# Patient Record
Sex: FEMALE | Race: WHITE | Employment: FULL TIME | URBAN - METROPOLITAN AREA
[De-identification: names, ages, dates, MRNs, and addresses within clinical notes are randomized per-mention and may not be internally consistent; named-entity substitution may affect disease eponyms.]

---

## 2017-08-01 ENCOUNTER — OFFICE VISIT (OUTPATIENT)
Dept: FAMILY MEDICINE CLINIC | Age: 42
End: 2017-08-01

## 2017-08-01 VITALS
SYSTOLIC BLOOD PRESSURE: 118 MMHG | HEIGHT: 68 IN | OXYGEN SATURATION: 98 % | RESPIRATION RATE: 16 BRPM | WEIGHT: 231.4 LBS | DIASTOLIC BLOOD PRESSURE: 59 MMHG | HEART RATE: 67 BPM | BODY MASS INDEX: 35.07 KG/M2 | TEMPERATURE: 98.9 F

## 2017-08-01 DIAGNOSIS — Z13.1 DIABETES MELLITUS SCREENING: ICD-10-CM

## 2017-08-01 DIAGNOSIS — Z13.220 SCREENING CHOLESTEROL LEVEL: ICD-10-CM

## 2017-08-01 DIAGNOSIS — Z13.220 SCREENING CHOLESTEROL LEVEL: Primary | ICD-10-CM

## 2017-08-01 RX ORDER — PHENTERMINE HYDROCHLORIDE 37.5 MG/1
37.5 TABLET ORAL
Qty: 30 TAB | Refills: 0 | Status: SHIPPED | OUTPATIENT
Start: 2017-08-01 | End: 2017-08-01 | Stop reason: CLARIF

## 2017-08-01 NOTE — PROGRESS NOTES
Albert Gordon is a 40 y.o.  female and presents with     Chief Complaint   Patient presents with    Physical     Subjective: Additional Concerns: none    Patient Active Problem List    Diagnosis Date Noted    Anemia 2016    Menorrhagia with regular cycle 2016    Anxiety 2016     Current Outpatient Prescriptions   Medication Sig Dispense Refill    Iron, Cbn & Gluc-FA-B12-C-DSS (FERRALET 90 DUAL-IRON DELIVERY) 90-1-12-50 mg-mg-mcg-mg tab Take 90 mg by mouth daily. 30 Tab 3    aspirin-acetaminophen-caffeine (EXCEDRIN ES) 250-250-65 mg per tablet Take 1 Tab by mouth every eight (8) hours as needed for Headache (for headaches during menses).  hydrOXYzine (VISTARIL) 25 mg capsule Take 1 Cap by mouth three (3) times daily as needed for Anxiety. Indications: ANXIETY 10 Cap 0     Allergies   Allergen Reactions    Pcn [Penicillins] Rash and Nausea and Vomiting     No past medical history on file.   Past Surgical History:   Procedure Laterality Date    HX GYN      2 c section - 2009     Family History   Problem Relation Age of Onset    Colon Cancer Maternal Grandmother     Cancer Maternal Grandfather       of bone cancer    Emphysema Paternal Grandmother       of emphysema - heavy smoker    Other Paternal Grandfather       of brain aneurysm     Social History   Substance Use Topics    Smoking status: Never Smoker    Smokeless tobacco: Never Used    Alcohol use No     ROS     General: negative for - chills, fatigue, fever, weight change  Endo: negative for - hot flashes, polydipsia/polyuria or temperature intolerance  Resp: negative for - cough, shortness of breath or wheezing  CV: negative for - chest pain, edema or palpitations  Neuro: negative for - confusion, headaches, seizures or weakness    Objective:  Vitals:    17 1249   BP: 118/59   Pulse: 67   Resp: 16   Temp: 98.9 °F (37.2 °C)   TempSrc: Oral   SpO2: 98%   Weight: 231 lb 6.4 oz (105 kg) Height: 5' 8\" (1.727 m)   PainSc:   0 - No pain   LMP: 07/15/2017     PE    Alert, well appearing, and in no distress, oriented to person, place, and time and overweight  Mental status - alert, oriented to person, place, and time, normal mood, behavior, speech, dress, motor activity, and thought processes  Chest - clear to auscultation, no wheezes, rales or rhonchi, symmetric air entry  Heart - normal rate, regular rhythm, normal S1, S2, no murmurs, rubs, clicks or gallops  Extremities - peripheral pulses normal, no pedal edema, no clubbing or cyanosis    LABS   No visits with results within 6 Month(s) from this visit. Latest known visit with results is:    Orders Only on 07/14/2016   Component Date Value Ref Range Status    T4, Free 07/18/2016 1.2  0.9 - 1.8 ng/dL Final    WBC 07/18/2016 4.3  4.0 - 11.0 K/uL Final    RBC 07/18/2016 4.40  3.80 - 5.20 M/uL Final    HGB 07/18/2016 11.0* 11.7 - 15.5 g/dL Final    HCT 07/18/2016 35.4  35.1 - 46.5 % Final    MCV 07/18/2016 81  80 - 95 fL Final    MCH 07/18/2016 25* 26 - 34 pg Final    MCHC 07/18/2016 31* 32 - 36 g/dL Final    RDW 07/18/2016 16.4* 10.0 - 16.0 % Final    PLATELET 15/57/3888 101  140 - 440 K/uL Final    MPV 07/18/2016 11.5* 6.0 - 10.8 fL Final    Glucose 07/18/2016 92  65 - 99 mg/dL Final    BUN 07/18/2016 18  6 - 22 mg/dL Final    Creatinine 07/18/2016 0.9  0.5 - 1.2 mg/dL Final    Sodium 07/18/2016 140  133 - 145 mmol/L Final    Potassium 07/18/2016 4.4  3.5 - 5.5 mmol/L Final    Chloride 07/18/2016 102  98 - 110 mmol/L Final    CO2 07/18/2016 25  20 - 32 mmol/L Final    AST (SGOT) 07/18/2016 15  10 - 37 U/L Final    ALT (SGPT) 07/18/2016 12  5 - 40 U/L Final    Alk.  phosphatase 07/18/2016 44  25 - 115 U/L Final    Bilirubin, total 07/18/2016 0.3  0.2 - 1.2 mg/dL Final    Calcium 07/18/2016 9.1  8.4 - 10.5 mg/dL Final    Protein, total 07/18/2016 6.9  6.4 - 8.3 g/dL Final    Albumin 07/18/2016 4.5  3.5 - 5.0 g/dL Final    A-G Ratio 07/18/2016 1.9  1.1 - 2.6 ratio Final    Globulin 07/18/2016 2.4  2.0 - 4.0 g/dL Final    Anion gap 07/18/2016 13.0  mmol/L Final    Comment: Test includes Albumin, Alkaline Phosphatase, ALT, AST, BUN, Calcium, CO2,  Chloride, Creatinine, Glucose, Potassium, Sodium, Total Bilirubin and Total  Protein. Estimated GFR results are reported in mL/min/1.73 sq.m. by the MDRD equation. This eGFR is validated for stable chronic renal failure patients. This   equation  is unreliable in acute illness or patients with normal renal function.  GFRAA 07/18/2016 >60.0  >60.0 Final    GFRNA 07/18/2016 >60.0  >60.0 Final    Triglyceride 07/18/2016 103  40 - 149 mg/dL Final    HDL Cholesterol 07/18/2016 42  40 - 59 mg/dL Final    Cholesterol, total 07/18/2016 145  110 - 200 mg/dL Final    LDL, calculated 07/18/2016 82  50 - 99 mg/dL Final    VLDL, calculated 07/18/2016 21  8 - 30 mg/dL Final    Comment: Test includes cholesterol, HDL cholesterol, triglycerides and LDL.   Cholesterol Recommended NCEP guidelines in mg/dL:  Less than 200      Desirable  200 - 239          Borderline High  Greater than or  = to 240   High      VITAMIN D, 25-HYDROXY 07/18/2016 30.3* 32.0 - 100.0 ng/mL Final       TESTS  Results for orders placed or performed in visit on 07/14/16   T4, FREE   Result Value Ref Range    T4, Free 1.2 0.9 - 1.8 ng/dL   CBC W/O DIFF   Result Value Ref Range    WBC 4.3 4.0 - 11.0 K/uL    RBC 4.40 3.80 - 5.20 M/uL    HGB 11.0 (L) 11.7 - 15.5 g/dL    HCT 35.4 35.1 - 46.5 %    MCV 81 80 - 95 fL    MCH 25 (L) 26 - 34 pg    MCHC 31 (L) 32 - 36 g/dL    RDW 16.4 (H) 10.0 - 16.0 %    PLATELET 650 681 - 809 K/uL    MPV 11.5 (H) 6.0 - 03.9 fL   METABOLIC PANEL, COMPREHENSIVE   Result Value Ref Range    Glucose 92 65 - 99 mg/dL    BUN 18 6 - 22 mg/dL    Creatinine 0.9 0.5 - 1.2 mg/dL    Sodium 140 133 - 145 mmol/L    Potassium 4.4 3.5 - 5.5 mmol/L    Chloride 102 98 - 110 mmol/L    CO2 25 20 - 32 mmol/L    AST (SGOT) 15 10 - 37 U/L    ALT (SGPT) 12 5 - 40 U/L    Alk. phosphatase 44 25 - 115 U/L    Bilirubin, total 0.3 0.2 - 1.2 mg/dL    Calcium 9.1 8.4 - 10.5 mg/dL    Protein, total 6.9 6.4 - 8.3 g/dL    Albumin 4.5 3.5 - 5.0 g/dL    A-G Ratio 1.9 1.1 - 2.6 ratio    Globulin 2.4 2.0 - 4.0 g/dL    Anion gap 13.0 mmol/L    GFRAA >60.0 >60.0    GFRNA >60.0 >60.0   LIPID PANEL   Result Value Ref Range    Triglyceride 103 40 - 149 mg/dL    HDL Cholesterol 42 40 - 59 mg/dL    Cholesterol, total 145 110 - 200 mg/dL    LDL, calculated 82 50 - 99 mg/dL    VLDL, calculated 21 8 - 30 mg/dL   VITAMIN D, 25 HYDROXY   Result Value Ref Range    VITAMIN D, 25-HYDROXY 30.3 (L) 32.0 - 100.0 ng/mL     Assessment/Plan:      1. Screening cholesterol level  - LIPID PANEL; Future    2. Diabetes mellitus screening  - HEMOGLOBIN A1C WITH EAG; Future    3. Weight gain - Patient given info about nutrition weight loss program and some free online resources. Lab review: orders written for new lab studies as appropriate; see orders. I have discussed the diagnosis with the patient and the intended plan as seen in the above orders. The patient has received an after-visit summary and questions were answered concerning future plans. I have discussed medication side effects and warnings with the patient as well. I have reviewed the plan of care with the patient, accepted their input and they are in agreement with the treatment goals. F/U as needed.      Jo Ann Coker MD

## 2017-08-01 NOTE — PROGRESS NOTES
Nicholas Forte is a 40 y.o. female presents to office for CPE. 1. Have you been to the ER, urgent care clinic or hospitalized since your last visit? yes  2. Have you seen any other providers outside of [facility] since your last visit? yes  3.  Have you had a Flu shot this year? no      Health Maintenance items with a due date reviewed with patient:  Health Maintenance Due   Topic Date Due    DTaP/Tdap/Td series (1 - Tdap) 03/13/1997    PAP AKA CERVICAL CYTOLOGY  12/15/2014    INFLUENZA AGE 9 TO ADULT  08/01/2017

## 2017-08-01 NOTE — PATIENT INSTRUCTIONS
Learning About High Cholesterol  What is high cholesterol? Cholesterol is a type of fat in your blood. It is needed for many body functions, such as making new cells. Cholesterol is made by your body. It also comes from food you eat. If you have too much cholesterol, it starts to build up in your arteries. This is called hardening of the arteries, or atherosclerosis. High cholesterol raises your risk of a heart attack and stroke. There are different types of cholesterol. LDL is the \"bad\" cholesterol. High LDL can raise your risk for heart disease, heart attack, and stroke. HDL is the \"good\" cholesterol. High HDL is linked with a lower risk for heart disease, heart attack, and stroke. Your cholesterol levels help your doctor find out your risk for having a heart attack or stroke. How can you prevent high cholesterol? A heart-healthy lifestyle can help you prevent high cholesterol. This lifestyle helps lower your risk for a heart attack and stroke. · Eat heart-healthy foods. ¨ Eat fruits, vegetables, whole grains (like oatmeal), dried beans and peas, nuts and seeds, soy products (like tofu), and fat-free or low-fat dairy products. ¨ Replace butter, margarine, and hydrogenated or partially hydrogenated oils with olive and canola oils. (Canola oil margarine without trans fat is fine.)  ¨ Replace red meat with fish, poultry, and soy protein (like tofu). ¨ Limit processed and packaged foods like chips, crackers, and cookies. · Be active. Exercise can improve your cholesterol level. Get at least 30 minutes of exercise on most days of the week. Walking is a good choice. You also may want to do other activities, such as running, swimming, cycling, or playing tennis or team sports. · Stay at a healthy weight. Lose weight if you need to. · Don't smoke. If you need help quitting, talk to your doctor about stop-smoking programs and medicines. These can increase your chances of quitting for good.   How is high cholesterol treated? The goal of treatment is to reduce your chances of having a heart attack or stroke. The goal is not to lower your cholesterol numbers only. · You may make lifestyle changes, such as eating healthy foods, not smoking, losing weight, and being more active. · You may have to take medicine. Follow-up care is a key part of your treatment and safety. Be sure to make and go to all appointments, and call your doctor if you are having problems. It's also a good idea to know your test results and keep a list of the medicines you take. Where can you learn more? Go to http://bebeto-zulay.info/. Enter P184 in the search box to learn more about \"Learning About High Cholesterol. \"  Current as of: April 3, 2017  Content Version: 11.3  © 4940-2431 [employer]. Care instructions adapted under license by [employer] (which disclaims liability or warranty for this information). If you have questions about a medical condition or this instruction, always ask your healthcare professional. [address] any warranty or liability for your use of this information. Hemoglobin A1c: About This Test  What is it? Hemoglobin A1c is a blood test that checks your average blood sugar level over the past 2 to 3 months. This test also is called a glycohemoglobin test or an A1c test.  Why is this test done? The A1c test is done to check how well your diabetes has been controlled over the past 2 to 3 months. Your doctor can use this information to adjust your medicine and diabetes treatment, if needed. How can you prepare for the test?  You do not need to stop eating before you have an A1c test. This test can be done at any time during the day, even after a meal.  What happens during the test?  The health professional taking a sample of your blood will:  · Wrap an elastic band around your upper arm.  This makes the veins below the band larger so it is easier to put a needle into the vein. · Clean the needle site with alcohol. · Put the needle into the vein. · Attach a tube to the needle to fill it with blood. · Remove the band from your arm when enough blood is collected. · Put a gauze pad or cotton ball over the needle site as the needle is removed. · Put pressure on the site and then put on a bandage. What else should you know about the test?  The test result is usually given as a percentage. The normal A1c is less than 5.7%. The A1c test result also can be used to find your estimated average glucose, or eAG. Your eAG and A1c show the same thing in two different ways. They both help you learn more about your average blood sugar range over the past 2 to 3 months. Where can you learn more? Go to http://bebeto-zulay.info/. Enter U216 in the search box to learn more about \"Hemoglobin A1c: About This Test.\"  Current as of: March 13, 2017  Content Version: 11.3  © 9529-0518 [employer]. Care instructions adapted under license by [employer] (which disclaims liability or warranty for this information). If you have questions about a medical condition or this instruction, always ask your healthcare professional. [place] [address] any warranty or liability for your use of this information.

## 2017-11-13 ENCOUNTER — TELEPHONE (OUTPATIENT)
Dept: FAMILY MEDICINE CLINIC | Age: 42
End: 2017-11-13

## 2017-11-13 NOTE — TELEPHONE ENCOUNTER
Called patient to remind her that she has not yet gotten her ordered labs completed. We are unable to fill out her Health Screening form for work.  Left message

## 2018-08-07 ENCOUNTER — OFFICE VISIT (OUTPATIENT)
Dept: FAMILY MEDICINE CLINIC | Age: 43
End: 2018-08-07

## 2018-08-07 VITALS
WEIGHT: 233 LBS | OXYGEN SATURATION: 98 % | TEMPERATURE: 97 F | DIASTOLIC BLOOD PRESSURE: 79 MMHG | BODY MASS INDEX: 35.31 KG/M2 | SYSTOLIC BLOOD PRESSURE: 123 MMHG | HEART RATE: 74 BPM | HEIGHT: 68 IN | RESPIRATION RATE: 17 BRPM

## 2018-08-07 DIAGNOSIS — D22.9 ATYPICAL MOLE: ICD-10-CM

## 2018-08-07 DIAGNOSIS — Z00.00 PHYSICAL EXAM: Primary | ICD-10-CM

## 2018-08-07 DIAGNOSIS — R06.83 SNORING: ICD-10-CM

## 2018-08-07 DIAGNOSIS — D64.9 ANEMIA, UNSPECIFIED TYPE: ICD-10-CM

## 2018-08-07 DIAGNOSIS — Z80.0 FAMILY HISTORY OF COLON CANCER REQUIRING SCREENING COLONOSCOPY: ICD-10-CM

## 2018-08-07 PROBLEM — E66.01 SEVERE OBESITY (BMI 35.0-39.9): Status: ACTIVE | Noted: 2018-08-07

## 2018-08-07 NOTE — PROGRESS NOTES
Noble Casanova is a 44 y.o.  female and presents with screening physical, fatigue, skin moles, snoring with unrefreshed sleep  And screening colonoscopy due to early family hx fo colon cancer. Chief Complaint   Patient presents with    Complete Physical     Subjective: Additional Concerns: none    Patient Active Problem List    Diagnosis Date Noted    Severe obesity (BMI 35.0-39.9) ([facility] [address] 2018    Anemia 2016    Menorrhagia with regular cycle 2016    Anxiety 2016     Current Outpatient Prescriptions   Medication Sig Dispense Refill    Iron, Cbn & Gluc-FA-B12-C-DSS (FERRALET 90 DUAL-IRON DELIVERY) 90-1-12-50 mg-mg-mcg-mg tab Take 90 mg by mouth daily. 30 Tab 3    aspirin-acetaminophen-caffeine (EXCEDRIN ES) 250-250-65 mg per tablet Take 1 Tab by mouth every eight (8) hours as needed for Headache (for headaches during menses).  hydrOXYzine (VISTARIL) 25 mg capsule Take 1 Cap by mouth three (3) times daily as needed for Anxiety. Indications: ANXIETY 10 Cap 0     Allergies   Allergen Reactions    Pcn [Penicillins] Rash and Nausea and Vomiting     No past medical history on file.   Past Surgical History:   Procedure Laterality Date    HX GYN      2 c section - 2009     Family History   Problem Relation Age of Onset    Colon Cancer Maternal Grandmother     Cancer Maternal Grandfather       of bone cancer    Emphysema Paternal Grandmother       of emphysema - heavy smoker    Other Paternal Grandfather       of brain aneurysm     Social History   Substance Use Topics    Smoking status: Never Smoker    Smokeless tobacco: Never Used    Alcohol use No     ROS     General: negative for - chills, positive fatigue, fever, weight change  Psych: negative for - anxiety, depression, irritability or mood swings  ENT: negative for - headaches, hearing change, nasal congestion, oral lesions, sneezing or sore throat  Heme/ Lymph: negative for - bleeding problems, bruising, pallor or swollen lymph nodes  Endo: negative for - hot flashes, polydipsia/polyuria or temperature intolerance  Resp: negative for - cough, shortness of breath or wheezing  CV: negative for - chest pain, edema or palpitations  GI: negative for - abdominal pain, change in bowel habits, constipation, diarrhea or nausea/vomiting  : negative for - dysuria, hematuria, incontinence, pelvic pain or vulvar/vaginal symptoms  MSK: negative for - joint pain, joint swelling or muscle pain  Neuro: negative for - confusion, headaches, seizures or weakness  Derm: negative for - dry skin, hair changes, rash, positive for lower back skin lesion changes    Objective:  Vitals:    08/07/18 1011   BP: 123/79   Pulse: 74   Resp: 17   Temp: 97 °F (36.1 °C)   TempSrc: Oral   SpO2: 98%   Weight: 233 lb (105.7 kg)   Height: 5' 8\" (1.727 m)   PainSc:   0 - No pain     PE    Alert, well appearing, and in no distress, oriented to person, place, and time and overweight  General appearance - alert, well appearing, and in no distress, oriented to person, place, and time and overweight  Mental status - alert, oriented to person, place, and time, normal mood, behavior, speech, dress, motor activity, and thought processes  Eyes - pupils equal and reactive, extraocular eye movements intact  Ears - bilateral TM's and external ear canals normal  Mouth - mucous membranes moist, pharynx normal without lesions  Neck - supple, no significant adenopathy  Chest - clear to auscultation, no wheezes, rales or rhonchi, symmetric air entry  Heart - normal rate, regular rhythm, normal S1, S2, no murmurs, rubs, clicks or gallops  Abdomen - soft, nontender, nondistended, no masses or organomegaly  Neurological - alert, oriented, normal speech, no focal findings or movement disorder noted  Musculoskeletal - no joint tenderness, deformity or swelling  Extremities - peripheral pulses normal, no pedal edema, no clubbing or cyanosis  Skin - LESIONS NOTED: localized, lesions on the back largest is elevated with large base lower mid back. LABS   No visits with results within 6 Month(s) from this visit. Latest known visit with results is:    Orders Only on 07/14/2016   Component Date Value Ref Range Status    T4, Free 07/18/2016 1.2  0.9 - 1.8 ng/dL Final    WBC 07/18/2016 4.3  4.0 - 11.0 K/uL Final    RBC 07/18/2016 4.40  3.80 - 5.20 M/uL Final    HGB 07/18/2016 11.0* 11.7 - 15.5 g/dL Final    HCT 07/18/2016 35.4  35.1 - 46.5 % Final    MCV 07/18/2016 81  80 - 95 fL Final    MCH 07/18/2016 25* 26 - 34 pg Final    MCHC 07/18/2016 31* 32 - 36 g/dL Final    RDW 07/18/2016 16.4* 10.0 - 16.0 % Final    PLATELET 77/14/8608 334  140 - 440 K/uL Final    MPV 07/18/2016 11.5* 6.0 - 10.8 fL Final    Glucose 07/18/2016 92  65 - 99 mg/dL Final    BUN 07/18/2016 18  6 - 22 mg/dL Final    Creatinine 07/18/2016 0.9  0.5 - 1.2 mg/dL Final    Sodium 07/18/2016 140  133 - 145 mmol/L Final    Potassium 07/18/2016 4.4  3.5 - 5.5 mmol/L Final    Chloride 07/18/2016 102  98 - 110 mmol/L Final    CO2 07/18/2016 25  20 - 32 mmol/L Final    AST (SGOT) 07/18/2016 15  10 - 37 U/L Final    ALT (SGPT) 07/18/2016 12  5 - 40 U/L Final    Alk. phosphatase 07/18/2016 44  25 - 115 U/L Final    Bilirubin, total 07/18/2016 0.3  0.2 - 1.2 mg/dL Final    Calcium 07/18/2016 9.1  8.4 - 10.5 mg/dL Final    Protein, total 07/18/2016 6.9  6.4 - 8.3 g/dL Final    Albumin 07/18/2016 4.5  3.5 - 5.0 g/dL Final    A-G Ratio 07/18/2016 1.9  1.1 - 2.6 ratio Final    Globulin 07/18/2016 2.4  2.0 - 4.0 g/dL Final    Anion gap 07/18/2016 13.0  mmol/L Final    Comment: Test includes Albumin, Alkaline Phosphatase, ALT, AST, BUN, Calcium, CO2,  Chloride, Creatinine, Glucose, Potassium, Sodium, Total Bilirubin and Total  Protein. Estimated GFR results are reported in mL/min/1.73 sq.m. by the MDRD equation. This eGFR is validated for stable chronic renal failure patients.  This equation  is unreliable in acute illness or patients with normal renal function.  GFRAA 07/18/2016 >60.0  >60.0 Final    GFRNA 07/18/2016 >60.0  >60.0 Final    Triglyceride 07/18/2016 103  40 - 149 mg/dL Final    HDL Cholesterol 07/18/2016 42  40 - 59 mg/dL Final    Cholesterol, total 07/18/2016 145  110 - 200 mg/dL Final    LDL, calculated 07/18/2016 82  50 - 99 mg/dL Final    VLDL, calculated 07/18/2016 21  8 - 30 mg/dL Final    Comment: Test includes cholesterol, HDL cholesterol, triglycerides and LDL. Cholesterol Recommended NCEP guidelines in mg/dL:  Less than 200      Desirable  200 - 239          Borderline High  Greater than or  = to 240   High      VITAMIN D, 25-HYDROXY 07/18/2016 30.3* 32.0 - 100.0 ng/mL Final       TESTS  Results for orders placed or performed in visit on 07/14/16   T4, FREE   Result Value Ref Range    T4, Free 1.2 0.9 - 1.8 ng/dL   CBC W/O DIFF   Result Value Ref Range    WBC 4.3 4.0 - 11.0 K/uL    RBC 4.40 3.80 - 5.20 M/uL    HGB 11.0 (L) 11.7 - 15.5 g/dL    HCT 35.4 35.1 - 46.5 %    MCV 81 80 - 95 fL    MCH 25 (L) 26 - 34 pg    MCHC 31 (L) 32 - 36 g/dL    RDW 16.4 (H) 10.0 - 16.0 %    PLATELET 481 064 - 177 K/uL    MPV 11.5 (H) 6.0 - 26.5 fL   METABOLIC PANEL, COMPREHENSIVE   Result Value Ref Range    Glucose 92 65 - 99 mg/dL    BUN 18 6 - 22 mg/dL    Creatinine 0.9 0.5 - 1.2 mg/dL    Sodium 140 133 - 145 mmol/L    Potassium 4.4 3.5 - 5.5 mmol/L    Chloride 102 98 - 110 mmol/L    CO2 25 20 - 32 mmol/L    AST (SGOT) 15 10 - 37 U/L    ALT (SGPT) 12 5 - 40 U/L    Alk.  phosphatase 44 25 - 115 U/L    Bilirubin, total 0.3 0.2 - 1.2 mg/dL    Calcium 9.1 8.4 - 10.5 mg/dL    Protein, total 6.9 6.4 - 8.3 g/dL    Albumin 4.5 3.5 - 5.0 g/dL    A-G Ratio 1.9 1.1 - 2.6 ratio    Globulin 2.4 2.0 - 4.0 g/dL    Anion gap 13.0 mmol/L    GFRAA >60.0 >60.0    GFRNA >60.0 >60.0   LIPID PANEL   Result Value Ref Range    Triglyceride 103 40 - 149 mg/dL    HDL Cholesterol 42 40 - 59 mg/dL Cholesterol, total 145 110 - 200 mg/dL    LDL, calculated 82 50 - 99 mg/dL    VLDL, calculated 21 8 - 30 mg/dL   VITAMIN D, 25 HYDROXY   Result Value Ref Range    VITAMIN D, 25-HYDROXY 30.3 (L) 32.0 - 100.0 ng/mL     Assessment/Plan:      1. Physical exam    - LIPID PANEL; Future  - CBC WITH AUTOMATED DIFF; Future  - METABOLIC PANEL, COMPREHENSIVE; Future  - TSH 3RD GENERATION; Future  - HEMOGLOBIN A1C WITH EAG; Future  - VITAMIN D, 1, 25 DIHYDROXY; Future    2. Anemia, unspecified type  - Iron, Cbn & Gluc-FA-B12-C-DSS (FERRALET 90 DUAL-IRON DELIVERY) 90-1-12-50 mg-mg-mcg-mg tab; Take 90 mg by mouth daily. Dispense: 30 Tab; Refill: 3    3. Family history of colon cancer requiring screening colonoscopy  - REFERRAL TO GASTROENTEROLOGY    4. Atypical mole low back with multiple other moles  - REFERRAL TO DERMATOLOGY    5. Snoring  - REFERRAL TO PULMONARY DISEASE    Lab review: orders written for new lab studies as appropriate; see orders. I have discussed the diagnosis with the patient and the intended plan as seen in the above orders. The patient has received an after-visit summary and questions were answered concerning future plans. I have discussed medication side effects and warnings with the patient as well. I have reviewed the plan of care with the patient, accepted their input and they are in agreement with the treatment goals. Follow-up Disposition:  Return in about 1 year (around 8/7/2019), or if symptoms worsen or fail to improve.     Valeria Barrientos MD

## 2018-08-07 NOTE — PATIENT INSTRUCTIONS
Moles: Care Instructions  Your Care Instructions  Moles are skin growths made up of cells that produce color (pigment). A mole can appear anywhere on the skin, alone or in groups. Most people get a few moles during their first 20 years of life. They are usually brown in color but can be blue, black, or flesh-colored. Most moles are harmless and do not cause pain or other symptoms, unless you rub them or they bump against something. You usually do not need treatment for moles. But some can turn into cancer. Talk to your doctor if a mole bleeds, itches, burns, or changes size or color. Also let your doctor know if you get a new mole. Make sure to wear sunscreen and other sun protection every day to help prevent skin cancer. Follow-up care is a key part of your treatment and safety. Be sure to make and go to all appointments, and call your doctor if you are having problems. It's also a good idea to know your test results and keep a list of the medicines you take. How can you care for yourself at home? · Check all the skin on your body once a month for skin growths or other changes, such as in the color and feel of the skin. ¨  front of a full-length mirror. Look carefully at the front and back of your body. Then look at your right and left sides with your arms raised. [facility] your elbows and look carefully at your forearms, the back of your upper arms, and your palms. ¨ Look at your feet, the bottoms of your feet, and the spaces between your toes. ¨ Use a hand mirror to look at the back of your legs, the back of your neck, and your back, rear end (buttocks), and genital area. Part the hair on your head to look at your scalp. · If you see a change in a skin growth, contact your doctor. Look for:  ¨ A mole that bleeds. ¨ A fast-growing mole. ¨ A scaly or crusted growth on the skin. ¨ A sore that will not heal.  To prevent skin cancer  · Always wear sunscreen on exposed skin.  Make sure to use a broad-spectrum sunscreen that has a sun protection factor (SPF) of 30 or higher. Use it every day, even when it is cloudy. · Wear a wide-brimmed hat and long sleeves and pants if you are going to be outdoors for very long. · Avoid the sun between 10 a.m. and 4 p.m., which is the peak time for the sun's ultraviolet rays. · Avoid sunburns, tanning booths, and sunlamps. · Be sure to protect children from the sun. Sunburns in childhood damage the skin and increase the risk of cancer. When should you call for help? Watch closely for changes in your health, and be sure to contact your doctor if:    · A mole looks different than it did before. It may have changed in size, color, shape, or the way it looks. Where can you learn more? Go to http://bebetoAdmeldzulay.info/. Enter M489 in the search box to learn more about \"Moles: Care Instructions. \"  Current as of: October 5, 2017  Content Version: 11.7  © 1518-0217 [employer]. Care instructions adapted under license by [employer] (which disclaims liability or warranty for this information). If you have questions about a medical condition or this instruction, always ask your healthcare professional. [address] any warranty or liability for your use of this information. Well Visit, Ages 25 to 48: Care Instructions  Your Care Instructions    Physical exams can help you stay healthy. Your doctor has checked your overall health and may have suggested ways to take good care of yourself. He or she also may have recommended tests. At home, you can help prevent illness with healthy eating, regular exercise, and other steps. Follow-up care is a key part of your treatment and safety. Be sure to make and go to all appointments, and call your doctor if you are having problems. It's also a good idea to know your test results and keep a list of the medicines you take.   How can you care for yourself at home?  · Reach and stay at a healthy weight. This will lower your risk for many problems, such as obesity, diabetes, heart disease, and high blood pressure. · Get at least 30 minutes of physical activity on most days of the week. Walking is a good choice. You also may want to do other activities, such as running, swimming, cycling, or playing tennis or team sports. Discuss any changes in your exercise program with your doctor. · Do not smoke or allow others to smoke around you. If you need help quitting, talk to your doctor about stop-smoking programs and medicines. These can increase your chances of quitting for good. · Talk to your doctor about whether you have any risk factors for sexually transmitted infections (STIs). Having one sex partner (who does not have STIs and does not have sex with anyone else) is a good way to avoid these infections. · Use birth control if you do not want to have children at this time. Talk with your doctor about the choices available and what might be best for you. · Protect your skin from too much sun. When you're outdoors from 10 a.m. to 4 p.m., stay in the shade or cover up with clothing and a hat with a wide brim. Wear sunglasses that block UV rays. Even when it's cloudy, put broad-spectrum sunscreen (SPF 30 or higher) on any exposed skin. · See a dentist one or two times a year for checkups and to have your teeth cleaned. · Wear a seat belt in the car. · Drink alcohol in moderation, if at all. That means no more than 2 drinks a day for men and 1 drink a day for women. Follow your doctor's advice about when to have certain tests. These tests can spot problems early. For everyone  · Cholesterol. Have the fat (cholesterol) in your blood tested after age 21. Your doctor will tell you how often to have this done based on your age, family history, or other things that can increase your risk for heart disease. · Blood pressure.  Have your blood pressure checked during a routine doctor visit. Your doctor will tell you how often to check your blood pressure based on your age, your blood pressure results, and other factors. · Vision. Talk with your doctor about how often to have a glaucoma test.  · Diabetes. Ask your doctor whether you should have tests for diabetes. · Colon cancer. Have a test for colon cancer at age 48. You may have one of several tests. If you are younger than 48, you may need a test earlier if you have any risk factors. Risk factors include whether you already had a precancerous polyp removed from your colon or whether your parent, brother, sister, or child has had colon cancer. For women  · Breast exam and mammogram. Talk to your doctor about when you should have a clinical breast exam and a mammogram. Medical experts differ on whether and how often women under 50 should have these tests. Your doctor can help you decide what is right for you. · Pap test and pelvic exam. Begin Pap tests at age 24. A Pap test is the best way to find cervical cancer. The test often is part of a pelvic exam. Ask how often to have this test.  · Tests for sexually transmitted infections (STIs). Ask whether you should have tests for STIs. You may be at risk if you have sex with more than one person, especially if your partners do not wear condoms. For men  · Tests for sexually transmitted infections (STIs). Ask whether you should have tests for STIs. You may be at risk if you have sex with more than one person, especially if you do not wear a condom. · Testicular cancer exam. Ask your doctor whether you should check your testicles regularly. · Prostate exam. Talk to your doctor about whether you should have a blood test (called a PSA test) for prostate cancer. Experts differ on whether and when men should have this test. Some experts suggest it if you are older than 39 and are -American or have a father or brother who got prostate cancer when he was younger than 72.   When should you call for help? Watch closely for changes in your health, and be sure to contact your doctor if you have any problems or symptoms that concern you. Where can you learn more? Go to http://ebbeto-zulay.info/. Enter P072 in the search box to learn more about \"Well Visit, Ages 25 to 48: Care Instructions. \"  Current as of: May 16, 2017  Content Version: 11.7  © 8005-4927 [employer]. Care instructions adapted under license by [employer] (which disclaims liability or warranty for this information). If you have questions about a medical condition or this instruction, always ask your healthcare professional. [place] [address] any warranty or liability for your use of this information. Snoring: Care Instructions  Your Care Instructions  Snoring is a noise that you may make while breathing during sleep. You snore when the flow of air from your mouth or nose to your lungs makes the tissues of your throat vibrate while you sleep. This usually is caused by a blockage or narrowing in your nose, mouth, or throat (airway). Snoring can be soft, loud, raspy, harsh, hoarse, or fluttering. Your bed partner may notice that you sleep with your mouth open and that you are restless while sleeping. If snoring interferes with your or your bed partner's sleep, either or both of you may feel tired during the day. You may be able to help reduce your snoring by making changes in your activities and in the way you sleep. Follow-up care is a key part of your treatment and safety. Be sure to make and go to all appointments, and call your doctor if you are having problems. It's also a good idea to know your test results and keep a list of the medicines you take. How can you care for yourself at home? · Lose weight, if needed. Many people who snore are overweight. Weight loss can help reduce the narrowing of the airway and might reduce or stop snoring.   · Limit the use of alcohol and medicines. Drinking a lot of alcohol or taking certain medicines, especially sleeping pills or tranquilizers, before sleep may make snoring worse. · Go to bed at the same time each night, and get plenty of sleep. You may snore more when you have not had enough sleep. · Sleep on your side. Sleeping on your side may stop snoring. Try sewing a pocket in the middle of the back of your pajama top, putting a tennis ball into the pocket, and stitching it closed. This will help keep you from sleeping on your back. · Treat breathing problems. Breathing problems caused by colds or allergies can disturb airflow. This can lead to snoring. · Use a device that helps keep your airway open during sleep. This could be a device that you put in your mouth. Other examples include strips or disks that you use on your nose. · Do not smoke. Smoking can make snoring worse. If you need help quitting, talk to your doctor about stop-smoking programs and medicines. These can increase your chances of quitting for good. · Raise the head of your bed 4 to 6 inches by putting bricks under the legs of the bed. This may prevent your tongue from falling toward the back of the throat, which can make a blocked or narrow airway worse. Putting pillows under your head will not help. When should you call for help? Watch closely for changes in your health, and be sure to contact your doctor if:    · You snore, and you feel sleepy during the day.     · Your sleeping partner or you notice that you gasp, choke, or stop breathing during sleep.     · You do not get better as expected. Where can you learn more? Go to http://bebeto-zulay.info/. Enter Z659 in the search box to learn more about \"Snoring: Care Instructions. \"  Current as of: December 6, 2017  Content Version: 11.7  © 1444-5706 [employer].  Care instructions adapted under license by [employer] (which disclaims liability or warranty for this information). If you have questions about a medical condition or this instruction, always ask your healthcare professional. [address] any warranty or liability for your use of this information. Learning About Colonoscopy  What is a colonoscopy? A colonoscopy is a test (also called a procedure) that lets a doctor look inside your large intestine. The doctor uses a thin, lighted tube called a colonoscope. The doctor uses it to look for small growths called polyps, colon or rectal cancer (colorectal cancer), or other problems like bleeding. During the procedure, the doctor can take samples of tissue. The samples can then be checked for cancer or other conditions. The doctor can also take out polyps. How is colonoscopy done? This procedure is done in a doctor's office or a clinic or hospital. You will get medicine to help you relax and not feel pain. Some people find that they do not remember having the test because of the medicine. The doctor gently moves the colonoscope, or scope, through the colon. The scope is also a small video camera. It lets the doctor see the colon and take pictures. A colonoscopy usually takes 30 to 45 minutes. It may take longer if the doctor has to remove polyps. How do you prepare for the procedure? You need to clean out your colon before the procedure so the doctor can see all of your colon. You may start the cleaning process a day or two before the test. This depends on which \"colon prep\" your doctor recommends. To clean your colon, you stop eating solid foods and drink only clear liquids. You can have water, tea, coffee, clear juices, clear broths, flavored ice pops, and gelatin (such as Jell-O). Do not drink anything red or purple, such as grape juice or fruit punch. And do not eat red or purple foods, such as grape ice pops or cherry gelatin. The day or night before the procedure, you drink a large amount of a special liquid. This causes loose, frequent stools. You will go to the bathroom a lot. It is very important to drink all of the colon prep liquid. If you have problems drinking the liquid, call your doctor. For many people, the prep is worse than the test. It may be uncomfortable, and you may feel hungry on the clear liquid diet. Some people do not go to work or do their usual activities on the day of the prep. Arrange to have someone take you home after the test.  What can you expect after a colonoscopy? The nurses will watch you for 1 to 2 hours until the medicines wear off. Then you can go home. You will need a ride. Your doctor will tell you when you can eat and do your usual activities. Your doctor will talk to you about when you will need your next colonoscopy. The results of your test and your risk for colorectal cancer will help your doctor decide how often you need to be checked. Follow-up care is a key part of your treatment and safety. Be sure to make and go to all appointments, and call your doctor if you are having problems. It's also a good idea to know your test results and keep a list of the medicines you take. Where can you learn more? Go to http://bebeto-zulay.info/. Enter U902 in the search box to learn more about \"Learning About Colonoscopy. \"  Current as of: May 12, 2017  Content Version: 11.7  © 0157-8285 [employer]. Care instructions adapted under license by [employer] (which disclaims liability or warranty for this information). If you have questions about a medical condition or this instruction, always ask your healthcare professional. [place] [address] any warranty or liability for your use of this information.

## 2018-08-08 ENCOUNTER — TELEPHONE (OUTPATIENT)
Dept: FAMILY MEDICINE CLINIC | Age: 43
End: 2018-08-08

## 2018-08-08 NOTE — TELEPHONE ENCOUNTER
Pt requesting for a Dermatology office with sooner appt.   Referral faxed to:  [address] [facility]  Dr. Wiliam Rodriguez  [address] [facility], [address] [place] [address]  [phone] Phone  [phone] Fax  Tax SB#[id number]  [facility]#[id number]    [facility]  Does NOT accept [facility]

## 2018-08-08 NOTE — TELEPHONE ENCOUNTER
Pt. Would like a referral to another Dermatologist if possible  because the Dr[facility]iels has no availability until October. [phone]    Please call.

## 2018-08-09 LAB
A-G RATIO,AGRAT: 1.4 RATIO (ref 1.1–2.6)
ABSOLUTE LYMPHOCYTE COUNT, 10803: 1.3 K/UL (ref 1–4.8)
ALBUMIN SERPL-MCNC: 4 G/DL (ref 3.5–5)
ALP SERPL-CCNC: 52 U/L (ref 25–115)
ALT SERPL-CCNC: 17 U/L (ref 5–40)
ANION GAP SERPL CALC-SCNC: 17 MMOL/L
AST SERPL W P-5'-P-CCNC: 14 U/L (ref 10–37)
AVG GLU, 10930: 98 MG/DL (ref 91–123)
BASOPHILS # BLD: 0 K/UL (ref 0–0.2)
BASOPHILS NFR BLD: 1 % (ref 0–2)
BILIRUB SERPL-MCNC: 0.2 MG/DL (ref 0.2–1.2)
BUN SERPL-MCNC: 12 MG/DL (ref 6–22)
CALCITRIOL, 081092: 27.4 PG/ML
CALCIUM SERPL-MCNC: 9.5 MG/DL (ref 8.4–10.5)
CHLORIDE SERPL-SCNC: 97 MMOL/L (ref 98–110)
CHOLEST SERPL-MCNC: 153 MG/DL (ref 110–200)
CO2 SERPL-SCNC: 25 MMOL/L (ref 20–32)
CREAT SERPL-MCNC: 0.7 MG/DL (ref 0.5–1.2)
EOSINOPHIL # BLD: 0.3 K/UL (ref 0–0.5)
EOSINOPHIL NFR BLD: 5 % (ref 0–6)
ERYTHROCYTE [DISTWIDTH] IN BLOOD BY AUTOMATED COUNT: 16.6 % (ref 10–15.5)
GFRAA, 66117: >60
GFRNA, 66118: >60
GLOBULIN,GLOB: 2.8 G/DL (ref 2–4)
GLUCOSE SERPL-MCNC: 93 MG/DL (ref 70–99)
GRANULOCYTES,GRANS: 64 % (ref 40–75)
HBA1C MFR BLD HPLC: 5 % (ref 4.8–5.9)
HCT VFR BLD AUTO: 38.7 % (ref 35.1–46.5)
HDLC SERPL-MCNC: 3.6 MG/DL (ref 0–5)
HDLC SERPL-MCNC: 43 MG/DL (ref 40–59)
HGB BLD-MCNC: 12.4 G/DL (ref 11.7–15.5)
LDLC SERPL CALC-MCNC: 81 MG/DL (ref 50–99)
LYMPHOCYTES, LYMLT: 25 % (ref 20–45)
MCH RBC QN AUTO: 27 PG (ref 26–34)
MCHC RBC AUTO-ENTMCNC: 32 G/DL (ref 31–36)
MCV RBC AUTO: 85 FL (ref 80–95)
MONOCYTES # BLD: 0.3 K/UL (ref 0.1–1)
MONOCYTES NFR BLD: 6 % (ref 3–12)
NEUTROPHILS # BLD AUTO: 3.4 K/UL (ref 1.8–7.7)
PLATELET # BLD AUTO: 258 K/UL (ref 140–440)
PMV BLD AUTO: 11.8 FL (ref 9–13)
POTASSIUM SERPL-SCNC: 4.6 MMOL/L (ref 3.5–5.5)
PROT SERPL-MCNC: 6.8 G/DL (ref 6.4–8.3)
RBC # BLD AUTO: 4.55 M/UL (ref 3.8–5.2)
SODIUM SERPL-SCNC: 139 MMOL/L (ref 133–145)
TRIGL SERPL-MCNC: 146 MG/DL (ref 40–149)
TSH SERPL DL<=0.005 MIU/L-ACNC: 2.51 MCU/ML (ref 0.27–4.2)
VLDLC SERPL CALC-MCNC: 29 MG/DL (ref 8–30)
WBC # BLD AUTO: 5.2 K/UL (ref 4–11)

## 2018-08-13 ENCOUNTER — TELEPHONE (OUTPATIENT)
Dept: FAMILY MEDICINE CLINIC | Age: 43
End: 2018-08-13

## 2018-08-13 NOTE — TELEPHONE ENCOUNTER
Spoke to pt and made aware of the message, verbalized understanding.  Thank you  Felipe Duverney, LPN

## 2018-08-13 NOTE — TELEPHONE ENCOUNTER
----- Message from Marci Angeles MD sent at 8/11/2018  1:36 PM EDT -----  Pls report normal to near normal labs. Routine screening and monitoring.

## 2018-08-23 RX ORDER — FERROUS GLUCONATE 324(37.5)
324 TABLET ORAL
Qty: 90 TAB | Refills: 1 | Status: SHIPPED | OUTPATIENT
Start: 2018-08-23 | End: 2019-02-19

## 2018-08-23 NOTE — TELEPHONE ENCOUNTER
Ferralet not covered by [employer]. Pending Ferrous gluconate. Requested Prescriptions     Pending Prescriptions Disp Refills    ferrous gluconate 324 mg (37.5 mg iron) tablet 90 Tab 1     Sig: Take 1 Tab by mouth Daily (before breakfast) for 180 days.